# Patient Record
Sex: MALE | Race: WHITE | NOT HISPANIC OR LATINO | Employment: FULL TIME | URBAN - METROPOLITAN AREA
[De-identification: names, ages, dates, MRNs, and addresses within clinical notes are randomized per-mention and may not be internally consistent; named-entity substitution may affect disease eponyms.]

---

## 2017-10-03 NOTE — PROGRESS NOTES
SUBJECTIVE:                                                    Enmanuel Patterson is a 31 year old male who presents to clinic today for the following health issues:      HPI    Acute Illness   Acute illness concerns: Cough  Onset: 1 month ago    Fever: no     Chills/Sweats: YES    Headache (location?): no    Sinus Pressure:YES    Conjunctivitis:  YES: watery    Ear Pain: feels pressure in both ears    Rhinorrhea: YES    Congestion: YES    Sore Throat: no      Cough: YES-productive of yellow sputum    Wheeze: YES    Decreased Appetite: YES    Nausea: no    Vomiting: no    Diarrhea:  no    Dysuria/Freq.: no    Fatigue/Achiness: YES    Sick/Strep Exposure: YES     Therapies Tried and outcome: aferin, tylenol    HENT: Patient says his cold-like symptoms started a month ago. He thought he was in the clear a week ago and it got worse in the last week so he decided to come in. He notes that he has a feeling of congestion in his lungs and says that he has been coughing a lot especially this weekend.     Photophobia: The pt notes he is having sensitivity to light a lot recently.     Smoking: the patient is not interested in quitting at this time, says the only reason he would want to is for financial purposes.     Problem list and histories reviewed & adjusted, as indicated.  Additional history: as documented    Patient Active Problem List   Diagnosis     CARDIOVASCULAR SCREENING; LDL GOAL LESS THAN 160     Tobacco use disorder     Eczema     Past Surgical History:   Procedure Laterality Date     bursitis knee  7/26/2013     TONSILLECTOMY      age 4       Social History   Substance Use Topics     Smoking status: Current Every Day Smoker     Packs/day: 1.00     Years: 9.00     Types: Cigarettes     Smokeless tobacco: Never Used      Comment: 1 pack per day since 1997     Alcohol use 0.0 oz/week      Comment: weekends     Family History   Problem Relation Age of Onset     DIABETES Paternal Grandfather      Asthma Paternal  "Grandfather          ROS:  Constitutional, HEENT, cardiovascular, pulmonary, GI, , musculoskeletal, neuro, skin, endocrine and psych systems are negative, except as in HPI or otherwise noted.     This document serves as a record of the services and decisions personally performed and made by Delmi Hernández MD. It was created on her behalf by Ritchie Infante, a trained medical scribe. The creation of this document is based the provider's statements to the medical scribe.  Ritchie Infante, October 4, 2017 8:25 AM     OBJECTIVE:                                                    BP 90/54 (BP Location: Right arm, Patient Position: Chair, Cuff Size: Adult Regular)  Pulse 76  Temp 97.7  F (36.5  C) (Temporal)  Resp 16  Ht 1.797 m (5' 10.75\")  Wt 75.3 kg (166 lb)  SpO2 98%  BMI 23.32 kg/m2  Body mass index is 23.32 kg/(m^2).     GENERAL: healthy, alert, well nourished, well hydrated, no distress  HENT: ear canals- normal; TMs- normal; Nose- normal; Mouth- no ulcers, no lesions  NECK: no tenderness, no adenopathy, no asymmetry, no masses, no stiffness; thyroid- normal to palpation  RESP: Left-sided crackle, wheezing bilaterally  CV: regular rates and rhythm, normal S1 S2, no S3 or S4 and no murmur, no click or rub  SKIN: no suspicious lesions, no rashes to visible skin   PSYCH: Alert and oriented times 3; speech- coherent , normal rate and volume; able to articulate logical thoughts, able to abstract reason, no tangential thoughts, no hallucinations or delusions, affect- normal    Diagnostic test results:  No results found for this or any previous visit (from the past 24 hour(s)).       ASSESSMENT/PLAN:                                                      ICD-10-CM    1. Pneumonia of left lower lobe due to infectious organism (H) J18.1 amoxicillin-clavulanate (AUGMENTIN) 875-125 MG per tablet   2. Tobacco use disorder F17.200 TOBACCO CESSATION ORDER FOR    3. Need for prophylactic vaccination and inoculation against " influenza Z23    4. Need for prophylactic vaccination with tetanus-diphtheria (TD) Z23    5. Acute bronchitis, unspecified organism J20.9 albuterol (PROAIR HFA/PROVENTIL HFA/VENTOLIN HFA) 108 (90 BASE) MCG/ACT Inhaler     Chronic cough: discussed possible etiologies (pertussis/pneumonia). Antibiotics prescribed for the pneumonia as well as Albuterol for airway dilation to aid in expulsion of the mucous. Medication direction, dosage, and side effects discussed with patient.    Photophobia: the patient is advised to rest and drink fluids for his sensitivity to light, as it is likely from a migraine headache.     Smoking: patient is offered a smoking cessation plan. He defers at this time.     Vaccination(s) administered: none, defers    There are no Patient Instructions on file for this visit.    The information in this document, created by the medical scribe for me, accurately reflects the services I personally performed and the decisions made by me. I have reviewed and approved this document for accuracy.   MD Delmi Dover MD, MD  [facility]

## 2017-10-04 ENCOUNTER — OFFICE VISIT (OUTPATIENT)
Dept: FAMILY MEDICINE | Facility: OTHER | Age: 32
End: 2017-10-04
Payer: COMMERCIAL

## 2017-10-04 VITALS
HEART RATE: 76 BPM | SYSTOLIC BLOOD PRESSURE: 90 MMHG | RESPIRATION RATE: 16 BRPM | TEMPERATURE: 97.7 F | OXYGEN SATURATION: 98 % | BODY MASS INDEX: 23.24 KG/M2 | DIASTOLIC BLOOD PRESSURE: 54 MMHG | WEIGHT: 166 LBS | HEIGHT: 71 IN

## 2017-10-04 DIAGNOSIS — Z23 NEED FOR PROPHYLACTIC VACCINATION AND INOCULATION AGAINST INFLUENZA: ICD-10-CM

## 2017-10-04 DIAGNOSIS — J20.9 ACUTE BRONCHITIS, UNSPECIFIED ORGANISM: ICD-10-CM

## 2017-10-04 DIAGNOSIS — Z23 NEED FOR PROPHYLACTIC VACCINATION WITH TETANUS-DIPHTHERIA (TD): ICD-10-CM

## 2017-10-04 DIAGNOSIS — F17.200 TOBACCO USE DISORDER: ICD-10-CM

## 2017-10-04 DIAGNOSIS — J18.9 PNEUMONIA OF LEFT LOWER LOBE DUE TO INFECTIOUS ORGANISM: Primary | ICD-10-CM

## 2017-10-04 PROCEDURE — 99213 OFFICE O/P EST LOW 20 MIN: CPT | Performed by: FAMILY MEDICINE

## 2017-10-04 RX ORDER — ALBUTEROL SULFATE 90 UG/1
2 AEROSOL, METERED RESPIRATORY (INHALATION) EVERY 6 HOURS PRN
Qty: 1 INHALER | Refills: 1 | Status: SHIPPED | OUTPATIENT
Start: 2017-10-04 | End: 2018-08-11

## 2017-10-04 NOTE — NURSING NOTE
"Chief Complaint   Patient presents with     Cough     Panel Management     tdap, flu, smoking cessation       Initial BP 90/54 (BP Location: Right arm, Patient Position: Chair, Cuff Size: Adult Regular)  Pulse 76  Temp 97.7  F (36.5  C) (Temporal)  Resp 16  Ht 5' 10.75\" (1.797 m)  Wt 166 lb (75.3 kg)  SpO2 98%  BMI 23.32 kg/m2 Estimated body mass index is 23.32 kg/(m^2) as calculated from the following:    Height as of this encounter: 5' 10.75\" (1.797 m).    Weight as of this encounter: 166 lb (75.3 kg).  Medication Reconciliation: complete     Delmi Caballero, [facility]  October 4, 2017      "

## 2017-10-04 NOTE — MR AVS SNAPSHOT
"              After Visit Summary   10/4/2017    Enmanuel Patterson    MRN: [id number]           Patient Information     Date Of Birth          1985        Visit Information        Provider Department      10/4/2017 8:30 AM Delmi Hernández MD [facility]        Today's Diagnoses     Pneumonia of left lower lobe due to infectious organism (H)    -  1    Tobacco use disorder        Need for prophylactic vaccination and inoculation against influenza        Need for prophylactic vaccination with tetanus-diphtheria (TD)        Acute bronchitis, unspecified organism           Follow-ups after your visit        Who to contact     If you have questions or need follow up information about today's clinic visit or your schedule please contact [facility] directly at [phone].  Normal or non-critical lab and imaging results will be communicated to you by MyChart, letter or phone within 4 business days after the clinic has received the results. If you do not hear from us within 7 days, please contact the clinic through [facility] or phone. If you have a critical or abnormal lab result, we will notify you by phone as soon as possible.  Submit refill requests through [email or web address] or call your pharmacy and they will forward the refill request to us. Please allow 3 business days for your refill to be completed.          Additional Information About Your Visit        MyChart Information     [email or web address] lets you send messages to your doctor, view your test results, renew your prescriptions, schedule appointments and more. To sign up, go to [email or web address] . Click on \"Log in\" on the left side of the screen, which will take you to the Welcome page. Then click on \"Sign up Now\" on the right side of the page.     You will be asked to enter the access code listed below, as well as some personal information. Please follow the directions to create your username and password.     Your access code is: " "[email or web address]  Expires: 2018  8:33 AM     Your access code will  in 90 days. If you need help or a new code, please call your [facility] clinic or [phone].        Care EveryWhere ID     This is your Care EveryWhere ID. This could be used by other organizations to access your [facility] medical records  [id number]        Your Vitals Were     Pulse Temperature Respirations Height Pulse Oximetry BMI (Body Mass Index)    76 97.7  F (36.5  C) (Temporal) 16 5' 10.75\" (1.797 m) 98% 23.32 kg/m2       Blood Pressure from Last 3 Encounters:   10/04/17 90/54   10/07/15 100/70   06/01/15 120/84    Weight from Last 3 Encounters:   10/04/17 166 lb (75.3 kg)   10/07/15 166 lb 3.2 oz (75.4 kg)   06/01/15 160 lb 1.6 oz (72.6 kg)              We Performed the Following     TOBACCO CESSATION ORDER FOR HM          Today's Medication Changes          These changes are accurate as of: 10/4/17  8:33 AM.  If you have any questions, ask your nurse or doctor.               Start taking these medicines.        Dose/Directions    albuterol 108 (90 BASE) MCG/ACT Inhaler   Commonly known as:  PROAIR HFA/PROVENTIL HFA/VENTOLIN HFA   Used for:  Acute bronchitis, unspecified organism   Started by:  Delmi Hernández MD        Dose:  2 puff   Inhale 2 puffs into the lungs every 6 hours as needed for shortness of breath / dyspnea or wheezing   Quantity:  1 Inhaler   Refills:  1       amoxicillin-clavulanate 875-125 MG per tablet   Commonly known as:  AUGMENTIN   Used for:  Pneumonia of left lower lobe due to infectious organism (H)   Started by:  Delmi Hernández MD        Dose:  1 tablet   Take 1 tablet by mouth 2 times daily   Quantity:  20 tablet   Refills:  0            Where to get your medicines      These medications were sent to [facility] [place], [place] - [address]  [address], [facility] [address]     Phone:  [phone]     albuterol 108 (90 BASE) MCG/ACT Inhaler    amoxicillin-clavulanate 875-125 MG " per tablet                Primary Care Provider Office Phone # Fax #    Zaki Jacome [phone]       [address] [facility] [address]        Equal Access to Services     SAIMA WESTFALL : Hi shahid keith White, waaxda luqadaha, qaybta kaalmada morro, jesse cm laUtepham boyce. So [facility] [phone].    ATENCIÓN: Si habla español, tiene a wray disposición servicios gratuitos de asistencia lingüística. Llame al [phone].    We comply with applicable federal civil rights laws and [place] laws. We do not discriminate on the basis of race, color, national origin, age, disability, sex, sexual orientation, or gender identity.            Thank you!     Thank you for choosing [facility]  for your care. Our goal is always to provide you with excellent care. Hearing back from our patients is one way we can continue to improve our services. Please take a few minutes to complete the written survey that you may receive in the mail after your visit with us. Thank you!             Your Updated Medication List - Protect others around you: Learn how to safely use, store and throw away your medicines at www.disposemymeds.org.          This list is accurate as of: 10/4/17  8:33 AM.  Always use your most recent med list.                   Brand Name Dispense Instructions for use Diagnosis    albuterol 108 (90 BASE) MCG/ACT Inhaler    PROAIR HFA/PROVENTIL HFA/VENTOLIN HFA    1 Inhaler    Inhale 2 puffs into the lungs every 6 hours as needed for shortness of breath / dyspnea or wheezing    Acute bronchitis, unspecified organism       amoxicillin-clavulanate 875-125 MG per tablet    AUGMENTIN    20 tablet    Take 1 tablet by mouth 2 times daily    Pneumonia of left lower lobe due to infectious organism (H)       ZYRTEC ALLERGY PO      Take 1 tablet by mouth daily.

## 2018-03-05 ENCOUNTER — OFFICE VISIT (OUTPATIENT)
Dept: FAMILY MEDICINE | Facility: CLINIC | Age: 33
End: 2018-03-05
Payer: COMMERCIAL

## 2018-03-05 VITALS
HEART RATE: 62 BPM | SYSTOLIC BLOOD PRESSURE: 110 MMHG | OXYGEN SATURATION: 98 % | DIASTOLIC BLOOD PRESSURE: 72 MMHG | BODY MASS INDEX: 23.72 KG/M2 | RESPIRATION RATE: 16 BRPM | WEIGHT: 175.1 LBS | TEMPERATURE: 98.7 F | HEIGHT: 72 IN

## 2018-03-05 DIAGNOSIS — M70.51 INFRAPATELLAR BURSITIS OF RIGHT KNEE: Primary | ICD-10-CM

## 2018-03-05 PROCEDURE — 99214 OFFICE O/P EST MOD 30 MIN: CPT | Performed by: PHYSICIAN ASSISTANT

## 2018-03-05 RX ORDER — DOXYCYCLINE 100 MG/1
100 CAPSULE ORAL 2 TIMES DAILY
Qty: 20 CAPSULE | Refills: 0 | Status: SHIPPED | OUTPATIENT
Start: 2018-03-05 | End: 2018-04-27

## 2018-03-05 ASSESSMENT — PAIN SCALES - GENERAL: PAINLEVEL: MODERATE PAIN (4)

## 2018-03-05 NOTE — PROGRESS NOTES
"  SUBJECTIVE:   Enmanuel Patterson is a 32 year old male who presents to clinic today for the following health issues:      Musculoskeletal Problem     History of Present Illness     Diet:  Regular (no restrictions)  Frequency of exercise:  None  Taking medications regularly:  Yes  Additional concerns today:  No    Joint Pain- bilateral knee    Onset: couple months  Gets swelling below both knee caps- is on knees often crawling around. .   The swelling has been present awhile and was getting annoying.  Hx of infrapatellar bursitis.   A few weeks ago sterilized a large needle from a nail kit and poked a hole in RIGHT knee (infrapatellar bursa?); drained thick straw color clear material.   Then filled back up.  So today just squeezed it hard and it drained from the site he poked at it initially. Drained a lot more blood than before. Kind of sore.   Does not feel warm to the touch  No fevers/chills  No malaise  Here today because \"maybe I need an antibiotic or something\".       Description:   Location: left knee and right knee  Character: Dull ache    Intensity: mild    Progression of Symptoms: better    Accompanying Signs & Symptoms:  Other symptoms: warmth, swelling and draining    History:   Previous similar pain: YES      Precipitating factors:   Trauma or overuse: YES- work    Alleviating factors:  Improved by: rest/inactivity, Ibuprofen and NSAID     Therapies Tried and outcome: rest/inactivity, Ibuprofen and NSAID       Problem list and histories reviewed & adjusted, as indicated.  Additional history: as documented      Patient Active Problem List   Diagnosis     CARDIOVASCULAR SCREENING; LDL GOAL LESS THAN 160     Tobacco use disorder     Eczema     Past Surgical History:   Procedure Laterality Date     bursitis knee  7/26/2013     TONSILLECTOMY      age 4       Social History   Substance Use Topics     Smoking status: Current Every Day Smoker     Packs/day: 1.00     Years: 9.00     Types: " "Cigarettes     Smokeless tobacco: Never Used      Comment: 1 pack per day since 1997     Alcohol use 0.0 oz/week      Comment: weekends     Family History   Problem Relation Age of Onset     DIABETES Paternal Grandfather      Asthma Paternal Grandfather          Current Outpatient Prescriptions   Medication Sig Dispense Refill     doxycycline (VIBRAMYCIN) 100 MG capsule Take 1 capsule (100 mg) by mouth 2 times daily 20 capsule 0     Cetirizine HCl (ZYRTEC ALLERGY PO) Take 1 tablet by mouth daily.       albuterol (PROAIR HFA/PROVENTIL HFA/VENTOLIN HFA) 108 (90 BASE) MCG/ACT Inhaler Inhale 2 puffs into the lungs every 6 hours as needed for shortness of breath / dyspnea or wheezing (Patient not taking: Reported on 3/5/2018) 1 Inhaler 1     Allergies   Allergen Reactions     Sulfa Drugs      BP Readings from Last 3 Encounters:   03/05/18 110/72   10/04/17 90/54   10/07/15 100/70    Wt Readings from Last 3 Encounters:   03/05/18 175 lb 1.6 oz (79.4 kg)   10/04/17 166 lb (75.3 kg)   10/07/15 166 lb 3.2 oz (75.4 kg)                  Labs reviewed in [facility]    ROS:  As above    OBJECTIVE:     /72  Pulse 62  Temp 98.7  F (37.1  C) (Temporal)  Resp 16  Ht 5' 11.85\" (1.825 m)  Wt 175 lb 1.6 oz (79.4 kg)  SpO2 98%  BMI 23.85 kg/m2  Body mass index is 23.85 kg/(m^2).  General: well appearing, pleasant 32 year old NAD  Knee: RIGHT: puncture site with small scab and 1mm confluent red ring around scab. No surrounding erythema, warmth or drainage from site. No bruising. Symmetric. Swelling of infrapatellar bursa, nontender, no erythema or warmth. No joint effusion. NON-tender to palpation at joint line. Patella nontender to exam, no prepatellar bursitis. No popliteal fullness or tenderness. ROM full extension, flexion past 90 degrees. No laxity with ligament testing. Meniscal testing negative. No calf pain or tenderness. Posterior tibial pulses normal, no edema, sensation intact. Gait normal.       Diagnostic Test " Results:  none     ASSESSMENT/PLAN:       1. Infrapatellar bursitis of right knee  Advised to STOP draining bursitis on his own- discussed risks of infection  Start doxy as below  Discussed treating bursitis- nsaids, knee pads, modifying activity  Ref to ortho given  - doxycycline (VIBRAMYCIN) 100 MG capsule; Take 1 capsule (100 mg) by mouth 2 times daily  Dispense: 20 capsule; Refill: 0  - ORTHOPEDICS ADULT REFERRAL    Follow Up: For worsening symptoms (ie new fevers, worsening pain, etc), non-improvement as expected/discussed, questions regarding your medications or treatment plan. Discussed parameters for follow up and included in After Visit Summary given to patient.      Annie Fox PA-C  [facility]  Answers for HPI/ROS submitted by the patient on 3/5/2018   PHQ-2 Score: 0

## 2018-03-05 NOTE — MR AVS SNAPSHOT
After Visit Summary   3/5/2018    Enmanuel Patterson    MRN: [id number]           Patient Information     Date Of Birth          1985        Visit Information        Provider Department      3/5/2018 4:00 PM Annie Fox PA-C [facility]        Today's Diagnoses     Infrapatellar bursitis of right knee    -  1      Care Instructions    Do not try to drain anymore material from the bursa    Start the doxycycline 100mg twice daily for 10 days  Should be seen urgently if red, hot, draining pus/mucus    Knee pads, stay off as able  Aleve twice daily- take with food. Stop if gives you heartburn or upset stomach.  Hydrate well when you take it.     If bursitis does not get better see  - orthopedic MD here in [place] on Thursdays    What is bursitis?  A bursa is a fluid-filled sac that helps cushion the muscles, tendons, and bones around a joint. When a bursa becomes inflamed, it s called bursitis. Common symptoms of bursitis include pain, tenderness, and swelling that limits movement of the joint.  What causes bursitis?  Bursitis is most often caused by overuse of a joint. The repeated movements irritate the bursa and may cause it to swell. When that happens, other surrounding tissues may become inflamed or have less space to move. Bursitis is most common in large joints such as the knee, shoulder, and hip.       Nonsurgical treatment involves both rest and exercise.    How is bursitis treated?  To help reduce pain and swelling, your healthcare provider may recommend one or more of the following:     Rest gives the bursa time to heal. This means limiting activities that put stress on the joint.    Anti-inflammatory medications help reduce painful swelling. In some cases, this can include injections of cortisone or other steroid medicines into the bursa.    Splints and support bandages improve your comfort and allow the bursa to heal.    Physical therapy may be used to  increase flexibility and strengthen muscles that support the joint.    Aspiration removes extra fluid from the bursa using a needle. This can help your healthcare provider find out what is causing your bursitis. For example, it might be an infection or overuse.     Surgery can be used to remove an inflamed or infected bursa. This is rarely needed.  Date Last Reviewed: 9/11/2015 2000-2017 [employer]. [address] [place] [address]. All rights reserved. This information is not intended as a substitute for professional medical care. Always follow your healthcare professional's instructions.                Follow-ups after your visit        Additional Services     ORTHOPEDICS ADULT REFERRAL       Your provider has referred you to: FMG: [facility] [phone]   [email or web address]  FMG: [facility] [phone]   [email or web address]  FMG: [facility] [phone]    [email or web address]    Please be aware that coverage of these services is subject to the terms and limitations of your health insurance plan.  Call member services at your health plan with any benefit or coverage questions.      Please bring the following to your appointment:    >>   Any x-rays, CTs or MRIs which have been performed.  Contact the facility where they were done to arrange for  prior to your scheduled appointment.    >>   List of current medications   >>   This referral request   >>   Any documents/labs given to you for this referral                  Who to contact     If you have questions or need follow up information about today's clinic visit or your schedule please contact [facility] directly at [phone].  Normal or non-critical lab and imaging results will be communicated to you by MyChart, letter or phone within 4 business days after  "the clinic has received the results. If you do not hear from us within 7 days, please contact the clinic through PlaceSpeak or phone. If you have a critical or abnormal lab result, we will notify you by phone as soon as possible.  Submit refill requests through PlaceSpeak or call your pharmacy and they will forward the refill request to us. Please allow 3 business days for your refill to be completed.          Additional Information About Your Visit        PlaceSpeak Information     PlaceSpeak lets you send messages to your doctor, view your test results, renew your prescriptions, schedule appointments and more. To sign up, go to [email or web address] . Click on \"Log in\" on the left side of the screen, which will take you to the Welcome page. Then click on \"Sign up Now\" on the right side of the page.     You will be asked to enter the access code listed below, as well as some personal information. Please follow the directions to create your username and password.     Your access code is: [email or web address]  Expires: 6/3/2018  4:47 PM     Your access code will  in 90 days. If you need help or a new code, please call your [place] clinic or [phone].        Care EveryWhere ID     This is your Care EveryWhere ID. This could be used by other organizations to access your [place] medical records  [id number]        Your Vitals Were     Pulse Temperature Respirations Height Pulse Oximetry BMI (Body Mass Index)    62 98.7  F (37.1  C) (Temporal) 16 5' 11.85\" (1.825 m) 98% 23.85 kg/m2       Blood Pressure from Last 3 Encounters:   18 110/72   10/04/17 90/54   10/07/15 100/70    Weight from Last 3 Encounters:   18 175 lb 1.6 oz (79.4 kg)   10/04/17 166 lb (75.3 kg)   10/07/15 166 lb 3.2 oz (75.4 kg)              We Performed the Following     ORTHOPEDICS ADULT REFERRAL          Today's Medication Changes          These changes are accurate as of 3/5/18  4:47 PM.  If you have any questions, ask your nurse or doctor.    "            Start taking these medicines.        Dose/Directions    doxycycline 100 MG capsule   Commonly known as:  VIBRAMYCIN   Used for:  Infrapatellar bursitis of right knee   Started by:  Annie Fox PA-C        Dose:  100 mg   Take 1 capsule (100 mg) by mouth 2 times daily   Quantity:  20 capsule   Refills:  0            Where to get your medicines      These medications were sent to [facility] [address] [place] [address] [facility] [address]  [address] [facility], [facility] [address]     Phone:  [phone]     doxycycline 100 MG capsule                Primary Care Provider Office Phone # Fax #    Zaki Jacome [phone]       [address] [facility] [address]        Equal Access to Services     [facility]: Hadii aad ku hadasho Soomaali, waaxda luqadaha, qaybta kaalmada adeegyada, jesse mercado . So [facility] [phone].    ATENCIÓN: Si habla español, tiene a wray disposición servicios gratuitos de asistencia lingüística. LlJ.[facility] [phone].    We comply with applicable federal civil rights laws and [place] laws. We do not discriminate on the basis of race, color, national origin, age, disability, sex, sexual orientation, or gender identity.            Thank you!     Thank you for choosing [facility]  for your care. Our goal is always to provide you with excellent care. Hearing back from our patients is one way we can continue to improve our services. Please take a few minutes to complete the written survey that you may receive in the mail after your visit with us. Thank you!             Your Updated Medication List - Protect others around you: Learn how to safely use, store and throw away your medicines at www.disposemymeds.org.          This list is accurate as of 3/5/18  4:47 PM.  Always use your most recent med list.                   Brand Name Dispense Instructions for use Diagnosis    albuterol  108 (90 BASE) MCG/ACT Inhaler    PROAIR HFA/PROVENTIL HFA/VENTOLIN HFA    1 Inhaler    Inhale 2 puffs into the lungs every 6 hours as needed for shortness of breath / dyspnea or wheezing    Acute bronchitis, unspecified organism       doxycycline 100 MG capsule    VIBRAMYCIN    20 capsule    Take 1 capsule (100 mg) by mouth 2 times daily    Infrapatellar bursitis of right knee       ZYRTEC ALLERGY PO      Take 1 tablet by mouth daily.

## 2018-03-05 NOTE — PATIENT INSTRUCTIONS
Do not try to drain anymore material from the bursa    Start the doxycycline 100mg twice daily for 10 days  Should be seen urgently if red, hot, draining pus/mucus    Knee pads, stay off as able  Aleve twice daily- take with food. Stop if gives you heartburn or upset stomach.  Hydrate well when you take it.     If bursitis does not get better see  - orthopedic MD here in [place] on Thursdays    What is bursitis?  A bursa is a fluid-filled sac that helps cushion the muscles, tendons, and bones around a joint. When a bursa becomes inflamed, it s called bursitis. Common symptoms of bursitis include pain, tenderness, and swelling that limits movement of the joint.  What causes bursitis?  Bursitis is most often caused by overuse of a joint. The repeated movements irritate the bursa and may cause it to swell. When that happens, other surrounding tissues may become inflamed or have less space to move. Bursitis is most common in large joints such as the knee, shoulder, and hip.       Nonsurgical treatment involves both rest and exercise.    How is bursitis treated?  To help reduce pain and swelling, your healthcare provider may recommend one or more of the following:     Rest gives the bursa time to heal. This means limiting activities that put stress on the joint.    Anti-inflammatory medications help reduce painful swelling. In some cases, this can include injections of cortisone or other steroid medicines into the bursa.    Splints and support bandages improve your comfort and allow the bursa to heal.    Physical therapy may be used to increase flexibility and strengthen muscles that support the joint.    Aspiration removes extra fluid from the bursa using a needle. This can help your healthcare provider find out what is causing your bursitis. For example, it might be an infection or overuse.     Surgery can be used to remove an inflamed or infected bursa. This is rarely needed.  Date Last Reviewed:  9/11/2015 2000-2017 [employer]. [address] [place] [address], [place] [address]. All rights reserved. This information is not intended as a substitute for professional medical care. Always follow your healthcare professional's instructions.

## 2018-03-08 ENCOUNTER — OFFICE VISIT (OUTPATIENT)
Dept: ORTHOPEDICS | Facility: CLINIC | Age: 33
End: 2018-03-08
Payer: COMMERCIAL

## 2018-03-08 VITALS — TEMPERATURE: 98.2 F | BODY MASS INDEX: 24.5 KG/M2 | HEIGHT: 71 IN | RESPIRATION RATE: 18 BRPM | WEIGHT: 175 LBS

## 2018-03-08 DIAGNOSIS — M70.41 PREPATELLAR BURSITIS OF RIGHT KNEE: Primary | ICD-10-CM

## 2018-03-08 PROCEDURE — 99243 OFF/OP CNSLTJ NEW/EST LOW 30: CPT | Performed by: ORTHOPAEDIC SURGERY

## 2018-03-08 NOTE — PROGRESS NOTES
Visit Date:   2018      HISTORY OF PRESENT ILLNESS:  Mr. Chahal is a 32-year-old male referred from Annie Fox for evaluation of right knee swelling and pain.  He has had problems for the past year with swelling over the front of the tibia.  He works in , does a lot of kneeling and crawling.  He has recently started using knee pads.  He has recently drained of fluid from his own bursa with a sterilized needle.  He has no numbness or tingling, does have some residual swelling, has dull constant pain rated 1/10, worse with kneeling, better if he does not kneel on it.  He has not used a compressive sleeve.      PHYSICAL EXAMINATION:  Examination shows full range of motion of the knee without pain.  He has no patellofemoral crepitation, negative medial and lateral Marie's.  He had no ligamentous laxity of MCL, LCL or cruciates.  He does have some swelling over the pretibial area on the right.  No swelling over the patella itself.  He has a small scab just to the lateral aspect of the pretibial area where he has recently drained fluid.  There is no increased warmth or erythema.  Sensation and circulation are intact.      IMPRESSION:  Pretibial bursitis with mild residual fluid here.  He had no sign of compression of the infrapatellar branch of saphenous nerve as he is not tender to percussion here.  We have advised a Tubigrip for swelling control, avoidance of kneeling and crawling.  If he has to kneel, he should use knee pads.  I have explained that surgery is only necessary if he develops pain from the bursitis, specifically if he has prepatellar bands that are painful.  For now, we will just observe this as he has minimal fluid and has not caused infection.  We will see him back p.r.n.         YUNIOR YOO MD             D: 2018   T: 2018   MT: MONSE      Name:     ALICIA CHAHAL   MRN:      [id number]        Account:      [id number]   :      1985            Visit Date:   03/08/2018      Document: [id number]

## 2018-03-08 NOTE — LETTER
3/8/2018         RE: Enmanuel Patterson  [address] [facility] [address]        Dear Colleague,    Thank you for referring your patient, Enmanuel Patterson, to the [facility]. Please see a copy of my visit note below.    Enmanuel Patterson is a 32 year old male who is seen in consultation at the request of Annie Fox PA-C  for right knee bursitis.    Past Medical History:   Diagnosis Date     Testicular torsion 1995     Tobacco abuse since 1997       Past Surgical History:   Procedure Laterality Date     bursitis knee  7/26/2013     TONSILLECTOMY      age 4       Family History   Problem Relation Age of Onset     DIABETES Paternal Grandfather      Asthma Paternal Grandfather        Social History     Social History     Marital status: Single     Spouse name: N/A     Number of children: N/A     Years of education: N/A     Occupational History     Not on file.     Social History Main Topics     Smoking status: Current Every Day Smoker     Packs/day: 1.00     Years: 9.00     Types: Cigarettes     Smokeless tobacco: Never Used      Comment: 1 pack per day since 1997     Alcohol use 0.0 oz/week      Comment: weekends     Drug use: No     Sexual activity: Yes     Partners: Female      Comment: not currently     Other Topics Concern      Service No     Blood Transfusions No     Caffeine Concern No     Occupational Exposure No     Hobby Hazards No     Sleep Concern No     Stress Concern No     Weight Concern No     Special Diet No     Exercise No     Bike Helmet Yes     Seat Belt No     Parent/Sibling W/ Cabg, Mi Or Angioplasty Before 65f 55m? No     Social History Narrative       Current Outpatient Prescriptions   Medication Sig Dispense Refill     doxycycline (VIBRAMYCIN) 100 MG capsule Take 1 capsule (100 mg) by mouth 2 times daily 20 capsule 0     albuterol (PROAIR HFA/PROVENTIL HFA/VENTOLIN HFA) 108 (90 BASE) MCG/ACT Inhaler Inhale 2 puffs into the lungs every 6 hours as needed for  "shortness of breath / dyspnea or wheezing 1 Inhaler 1     Cetirizine HCl (ZYRTEC ALLERGY PO) Take 1 tablet by mouth daily.         Allergies   Allergen Reactions     Sulfa Drugs        REVIEW OF SYSTEMS:  CONSTITUTIONAL:  NEGATIVE for fever, chills, change in weight, not feeling tired  SKIN:  NEGATIVE for worrisome rashes, no skin lumps, no skin ulcers and no non-healing wounds  EYES:  NEGATIVE for vision changes or irritation.  ENT/MOUTH:  NEGATIVE.  No hearing loss, no hoarseness, no difficulty swallowing.  RESP:  NEGATIVE. No cough or shortness of breath.  CV:  NEGATIVE for chest pain, palpitations or peripheral edema  GI:  NEGATIVE for nausea, abdominal pain, heartburn, or change in bowel habits  :  Negative. No dysuria, no hematuria  MUSCULOSKELETAL:  See HPI above  NEURO:  NEGATIVE . No headaches, no dizziness,  no numbness  ENDOCRINE:  NEGATIVE for temperature intolerance, skin/hair changes  HEME/ALLERGY/IMMUNE:  NEGATIVE for bleeding problems  PSYCHIATRIC:  NEGATIVE. no anxiety, no depression.      Exam:  Vitals: Temp 98.2  F (36.8  C)  Resp 18  Ht 1.797 m (5' 10.75\")  Wt 79.4 kg (175 lb)  BMI 24.58 kg/m2  BMI= Body mass index is 24.58 kg/(m^2).  Constitutional:  healthy, alert and no distress  Neuro: Alert and Oriented x 3, Gait normal. Sensation grossly WNL.  HEENT:  Atraumatic, EOMI  Neck:  Neck supple with no tenderness.  Psych: Affect normal   Respiratory: Breathing not labored.  Cardiovascular: normal peripheral pulses  Lymph: no adenopathy  Skin: No rashes,worrisome lesions or skin problems  Spine: straight, no straight leg raising pain.  Hips show full range of motion.  There is no tenderness over the sacro-iliac joints, sciatic notch, or greater trochanters.       Again, thank you for allowing me to participate in the care of your patient.        Sincerely,        Abel Caballero MD    "

## 2018-03-08 NOTE — MR AVS SNAPSHOT
"              After Visit Summary   3/8/2018    Enmanuel Patterson    MRN: [id number]           Patient Information     Date Of Birth          1985        Visit Information        Provider Department      3/8/2018 10:45 AM Abel Caballero MD [facility]        Today's Diagnoses     Prepatellar bursitis of right knee    -  1       Follow-ups after your visit        Who to contact     If you have questions or need follow up information about today's clinic visit or your schedule please contact [facility] at [phone].  Normal or non-critical lab and imaging results will be communicated to you by Merushart, letter or phone within 4 business days after the clinic has received the results. If you do not hear from us within 7 days, please contact the clinic through Merushart or phone. If you have a critical or abnormal lab result, we will notify you by phone as soon as possible.  Submit refill requests through SigFig or call your pharmacy and they will forward the refill request to us. Please allow 3 business days for your refill to be completed.          Additional Information About Your Visit        MyChart Information     SigFig lets you send messages to your doctor, view your test results, renew your prescriptions, schedule appointments and more. To sign up, go to [email or web address] . Click on \"Log in\" on the left side of the screen, which will take you to the Welcome page. Then click on \"Sign up Now\" on the right side of the page.     You will be asked to enter the access code listed below, as well as some personal information. Please follow the directions to create your username and password.     Your access code is: [email or web address]  Expires: 6/3/2018  4:47 PM     Your access code will  in 90 days. If you need help or a new code, please call your [facility] or [phone].        Care EveryWhere ID     This is your Care EveryWhere ID. This could be used by " "other organizations to access your [place] medical records  [id number]        Your Vitals Were     Temperature Respirations Height BMI (Body Mass Index)          98.2  F (36.8  C) 18 1.797 m (5' 10.75\") 24.58 kg/m2         Blood Pressure from Last 3 Encounters:   03/05/18 110/72   10/04/17 90/54   10/07/15 100/70    Weight from Last 3 Encounters:   03/08/18 79.4 kg (175 lb)   03/05/18 79.4 kg (175 lb 1.6 oz)   10/04/17 75.3 kg (166 lb)              Today, you had the following     No orders found for display       Primary Care Provider Office Phone # Fax #    Zaki Jacome [phone] [address] [facility] [address]        Equal Access to Services     [facility]: Hadii soy shahid hadasho Soomaali, waaxda luqadaha, qaybta kaalmada adeindrayada, jesse mercado . So [facility] [phone].    ATENCIÓN: Si habla español, tiene a wray disposición servicios gratuitos de asistencia lingüística. Llame al [phone].    We comply with applicable federal civil rights laws and [place] laws. We do not discriminate on the basis of race, color, national origin, age, disability, sex, sexual orientation, or gender identity.            Thank you!     Thank you for choosing [facility]  for your care. Our goal is always to provide you with excellent care. Hearing back from our patients is one way we can continue to improve our services. Please take a few minutes to complete the written survey that you may receive in the mail after your visit with us. Thank you!             Your Updated Medication List - Protect others around you: Learn how to safely use, store and throw away your medicines at www.disposemymeds.org.          This list is accurate as of 3/8/18 11:51 AM.  Always use your most recent med list.                   Brand Name Dispense Instructions for use Diagnosis    albuterol 108 (90 BASE) MCG/ACT Inhaler    PROAIR HFA/PROVENTIL HFA/VENTOLIN HFA    1 Inhaler "    Inhale 2 puffs into the lungs every 6 hours as needed for shortness of breath / dyspnea or wheezing    Acute bronchitis, unspecified organism       doxycycline 100 MG capsule    VIBRAMYCIN    20 capsule    Take 1 capsule (100 mg) by mouth 2 times daily    Infrapatellar bursitis of right knee       ZYRTEC ALLERGY PO      Take 1 tablet by mouth daily.

## 2018-03-08 NOTE — PROGRESS NOTES
Enmanuel Patterson is a 32 year old male who is seen in consultation at the request of Annie Fox PA-C  for right knee bursitis.    Past Medical History:   Diagnosis Date     Testicular torsion 1995     Tobacco abuse since 1997       Past Surgical History:   Procedure Laterality Date     bursitis knee  7/26/2013     TONSILLECTOMY      age 4       Family History   Problem Relation Age of Onset     DIABETES Paternal Grandfather      Asthma Paternal Grandfather        Social History     Social History     Marital status: Single     Spouse name: N/A     Number of children: N/A     Years of education: N/A     Occupational History     Not on file.     Social History Main Topics     Smoking status: Current Every Day Smoker     Packs/day: 1.00     Years: 9.00     Types: Cigarettes     Smokeless tobacco: Never Used      Comment: 1 pack per day since 1997     Alcohol use 0.0 oz/week      Comment: weekends     Drug use: No     Sexual activity: Yes     Partners: Female      Comment: not currently     Other Topics Concern      Service No     Blood Transfusions No     Caffeine Concern No     Occupational Exposure No     Hobby Hazards No     Sleep Concern No     Stress Concern No     Weight Concern No     Special Diet No     Exercise No     Bike Helmet Yes     Seat Belt No     Parent/Sibling W/ Cabg, Mi Or Angioplasty Before 65f 55m? No     Social History Narrative       Current Outpatient Prescriptions   Medication Sig Dispense Refill     doxycycline (VIBRAMYCIN) 100 MG capsule Take 1 capsule (100 mg) by mouth 2 times daily 20 capsule 0     albuterol (PROAIR HFA/PROVENTIL HFA/VENTOLIN HFA) 108 (90 BASE) MCG/ACT Inhaler Inhale 2 puffs into the lungs every 6 hours as needed for shortness of breath / dyspnea or wheezing 1 Inhaler 1     Cetirizine HCl (ZYRTEC ALLERGY PO) Take 1 tablet by mouth daily.         Allergies   Allergen Reactions     Sulfa Drugs        REVIEW OF SYSTEMS:  CONSTITUTIONAL:  NEGATIVE for fever,  "chills, change in weight, not feeling tired  SKIN:  NEGATIVE for worrisome rashes, no skin lumps, no skin ulcers and no non-healing wounds  EYES:  NEGATIVE for vision changes or irritation.  ENT/MOUTH:  NEGATIVE.  No hearing loss, no hoarseness, no difficulty swallowing.  RESP:  NEGATIVE. No cough or shortness of breath.  CV:  NEGATIVE for chest pain, palpitations or peripheral edema  GI:  NEGATIVE for nausea, abdominal pain, heartburn, or change in bowel habits  :  Negative. No dysuria, no hematuria  MUSCULOSKELETAL:  See HPI above  NEURO:  NEGATIVE . No headaches, no dizziness,  no numbness  ENDOCRINE:  NEGATIVE for temperature intolerance, skin/hair changes  HEME/ALLERGY/IMMUNE:  NEGATIVE for bleeding problems  PSYCHIATRIC:  NEGATIVE. no anxiety, no depression.      Exam:  Vitals: Temp 98.2  F (36.8  C)  Resp 18  Ht 1.797 m (5' 10.75\")  Wt 79.4 kg (175 lb)  BMI 24.58 kg/m2  BMI= Body mass index is 24.58 kg/(m^2).  Constitutional:  healthy, alert and no distress  Neuro: Alert and Oriented x 3, Gait normal. Sensation grossly WNL.  HEENT:  Atraumatic, EOMI  Neck:  Neck supple with no tenderness.  Psych: Affect normal   Respiratory: Breathing not labored.  Cardiovascular: normal peripheral pulses  Lymph: no adenopathy  Skin: No rashes,worrisome lesions or skin problems  Spine: straight, no straight leg raising pain.  Hips show full range of motion.  There is no tenderness over the sacro-iliac joints, sciatic notch, or greater trochanters.     "

## 2018-04-27 ENCOUNTER — OFFICE VISIT (OUTPATIENT)
Dept: FAMILY MEDICINE | Facility: CLINIC | Age: 33
End: 2018-04-27
Payer: COMMERCIAL

## 2018-04-27 VITALS
WEIGHT: 168 LBS | BODY MASS INDEX: 23.6 KG/M2 | HEART RATE: 82 BPM | TEMPERATURE: 98 F | SYSTOLIC BLOOD PRESSURE: 106 MMHG | DIASTOLIC BLOOD PRESSURE: 74 MMHG | OXYGEN SATURATION: 98 %

## 2018-04-27 DIAGNOSIS — Z11.3 SCREEN FOR STD (SEXUALLY TRANSMITTED DISEASE): ICD-10-CM

## 2018-04-27 DIAGNOSIS — B07.9 VIRAL WARTS, UNSPECIFIED TYPE: ICD-10-CM

## 2018-04-27 DIAGNOSIS — Z13.1 SCREENING FOR DIABETES MELLITUS: ICD-10-CM

## 2018-04-27 DIAGNOSIS — M25.50 PAIN IN JOINT, MULTIPLE SITES: ICD-10-CM

## 2018-04-27 DIAGNOSIS — M79.10 MYALGIA: ICD-10-CM

## 2018-04-27 DIAGNOSIS — Z13.220 SCREENING FOR HYPERLIPIDEMIA: ICD-10-CM

## 2018-04-27 DIAGNOSIS — N52.9 ERECTILE DYSFUNCTION, UNSPECIFIED ERECTILE DYSFUNCTION TYPE: ICD-10-CM

## 2018-04-27 DIAGNOSIS — F17.200 TOBACCO USE DISORDER: ICD-10-CM

## 2018-04-27 DIAGNOSIS — Z71.6 ENCOUNTER FOR SMOKING CESSATION COUNSELING: ICD-10-CM

## 2018-04-27 DIAGNOSIS — R53.83 FATIGUE, UNSPECIFIED TYPE: Primary | ICD-10-CM

## 2018-04-27 LAB
ERYTHROCYTE [DISTWIDTH] IN BLOOD BY AUTOMATED COUNT: 13.4 % (ref 10–15)
ERYTHROCYTE [SEDIMENTATION RATE] IN BLOOD BY WESTERGREN METHOD: 5 MM/H (ref 0–15)
HBA1C MFR BLD: 5.2 % (ref 0–5.6)
HCT VFR BLD AUTO: 43.1 % (ref 40–53)
HGB BLD-MCNC: 14.9 G/DL (ref 13.3–17.7)
MCH RBC QN AUTO: 31.4 PG (ref 26.5–33)
MCHC RBC AUTO-ENTMCNC: 34.6 G/DL (ref 31.5–36.5)
MCV RBC AUTO: 91 FL (ref 78–100)
PLATELET # BLD AUTO: 218 10E9/L (ref 150–450)
RBC # BLD AUTO: 4.75 10E12/L (ref 4.4–5.9)
WBC # BLD AUTO: 7 10E9/L (ref 4–11)

## 2018-04-27 PROCEDURE — 99214 OFFICE O/P EST MOD 30 MIN: CPT | Performed by: PHYSICIAN ASSISTANT

## 2018-04-27 PROCEDURE — 80061 LIPID PANEL: CPT | Performed by: PHYSICIAN ASSISTANT

## 2018-04-27 PROCEDURE — 83036 HEMOGLOBIN GLYCOSYLATED A1C: CPT | Performed by: PHYSICIAN ASSISTANT

## 2018-04-27 PROCEDURE — 87591 N.GONORRHOEAE DNA AMP PROB: CPT | Performed by: PHYSICIAN ASSISTANT

## 2018-04-27 PROCEDURE — 87491 CHLMYD TRACH DNA AMP PROBE: CPT | Performed by: PHYSICIAN ASSISTANT

## 2018-04-27 PROCEDURE — 85027 COMPLETE CBC AUTOMATED: CPT | Performed by: PHYSICIAN ASSISTANT

## 2018-04-27 PROCEDURE — 85652 RBC SED RATE AUTOMATED: CPT | Performed by: PHYSICIAN ASSISTANT

## 2018-04-27 PROCEDURE — 82306 VITAMIN D 25 HYDROXY: CPT | Performed by: PHYSICIAN ASSISTANT

## 2018-04-27 PROCEDURE — 86431 RHEUMATOID FACTOR QUANT: CPT | Performed by: PHYSICIAN ASSISTANT

## 2018-04-27 PROCEDURE — 87389 HIV-1 AG W/HIV-1&-2 AB AG IA: CPT | Performed by: PHYSICIAN ASSISTANT

## 2018-04-27 PROCEDURE — 84403 ASSAY OF TOTAL TESTOSTERONE: CPT | Performed by: PHYSICIAN ASSISTANT

## 2018-04-27 PROCEDURE — 86618 LYME DISEASE ANTIBODY: CPT | Performed by: PHYSICIAN ASSISTANT

## 2018-04-27 PROCEDURE — 86140 C-REACTIVE PROTEIN: CPT | Performed by: PHYSICIAN ASSISTANT

## 2018-04-27 PROCEDURE — 36415 COLL VENOUS BLD VENIPUNCTURE: CPT | Performed by: PHYSICIAN ASSISTANT

## 2018-04-27 PROCEDURE — 82550 ASSAY OF CK (CPK): CPT | Performed by: PHYSICIAN ASSISTANT

## 2018-04-27 PROCEDURE — 84443 ASSAY THYROID STIM HORMONE: CPT | Performed by: PHYSICIAN ASSISTANT

## 2018-04-27 PROCEDURE — 86780 TREPONEMA PALLIDUM: CPT | Performed by: PHYSICIAN ASSISTANT

## 2018-04-27 RX ORDER — IMIQUIMOD 12.5 MG/.25G
CREAM TOPICAL
Qty: 8 PACKET | Refills: 0 | Status: SHIPPED | OUTPATIENT
Start: 2018-04-27 | End: 2018-08-11

## 2018-04-27 ASSESSMENT — PAIN SCALES - GENERAL: PAINLEVEL: NO PAIN (0)

## 2018-04-27 NOTE — PROGRESS NOTES
"  SUBJECTIVE:   Enmanuel Patterson is a 32 year old male who presents to clinic today for the following health issues:      HPI  Joint Pain and fatigue     Onset: 2 months or so, no injury involved, low energy, poor/lack of sleep.      Patient wanted to check on his testosterone level.    Low sex drive. No desire to have intercourse or masturbate. Ongoing for over 1 yr. Worsening. Is now an issue in his relationship. Relationship is good otherwise - low desire \"its not her\".   Get erections, harder to achieve. Wakes with am erections \"not often\"- did this morning. Last intercourse 1 mo ago- erection is firm enough for intercourse most of the time but at times is softer. This has caused arguments with significant other.   Low motivation to do stuff. Could go home and just sit on couch. 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   Feels more emotional lately. \"Work sucks\". Has kids ages 6, and significant other has 10 yr old..   No financial stress. No extended family stress..   +smokin pack per day  Never had lipids checked  No fam hx of MI, diabetes in grandparent  Fatigued.   Doesn't sleep well. Up and out of bed. Going on for years. Tried sleep meds but made too groggy.   No herbals, supplements    Joints are stiff, muscles are sore. Legs and arms, feet. Newer in last 1-2 months. Worse at end of day.   Feels both muscular and joint.   No red, hot joints.   Mom \"issues with muscles\"  Outdoors - no specific tick exposure- but does a lot of outdoor activity  Work boots are supportive.   Eczema rashes in the past. No new rashes.     Std screening- would like testing. Monogamous. Hx of genital wart- treated w/aldara. Concerned has a a wart on the scrotum. Looks the same as a wart had treated a few yrs ago.  No new sexual partners.     Not ready to quit smoking. Has tried many things to quit. Has quit in the past but restarts. Meds, breathe right laser, etc.   \"I have to want to quit\".       Description: " "  Location: overall feel sore,  Character: body ache     Intensity: 0    Progression of Symptoms: same    Accompanying Signs & Symptoms:  Other symptoms: none    History:   Previous similar pain: no       Precipitating factors:   Trauma or overuse: no     Alleviating factors:  Improved by: nothing    Therapies Tried and outcome: nyquil for sleep- it seem to help, but feel \"like zombie\" the next morning      Problem list and histories reviewed & adjusted, as indicated.  Additional history: as documented      Patient Active Problem List   Diagnosis     CARDIOVASCULAR SCREENING; LDL GOAL LESS THAN 160     Tobacco use disorder     Eczema     Prepatellar bursitis of right knee     Past Surgical History:   Procedure Laterality Date     bursitis knee  7/26/2013     TONSILLECTOMY      age 4       Social History   Substance Use Topics     Smoking status: Current Every Day Smoker     Packs/day: 1.00     Years: 9.00     Types: Cigarettes     Smokeless tobacco: Current User     Types: Chew      Comment: 1 pack per day since 1997     Alcohol use 0.0 oz/week      Comment: weekends     Family History   Problem Relation Age of Onset     DIABETES Paternal Grandfather      Asthma Paternal Grandfather      No Known Problems Mother      No Known Problems Father          Current Outpatient Prescriptions   Medication Sig Dispense Refill     albuterol (PROAIR HFA/PROVENTIL HFA/VENTOLIN HFA) 108 (90 BASE) MCG/ACT Inhaler Inhale 2 puffs into the lungs every 6 hours as needed for shortness of breath / dyspnea or wheezing (Patient not taking: Reported on 4/27/2018) 1 Inhaler 1     Cetirizine HCl (ZYRTEC ALLERGY PO) Take 1 tablet by mouth daily.       Allergies   Allergen Reactions     Sulfa Drugs      BP Readings from Last 3 Encounters:   04/27/18 106/74   03/05/18 110/72   10/04/17 90/54    Wt Readings from Last 3 Encounters:   04/27/18 168 lb (76.2 kg)   03/08/18 175 lb (79.4 kg)   03/05/18 175 lb 1.6 oz (79.4 kg)                  Labs " reviewed in EPIC    ROS:  Constitutional, HEENT, cardiovascular, pulmonary, gi and gu systems are negative, except as otherwise noted.    OBJECTIVE:     /74  Pulse 82  Temp 98  F (36.7  C) (Oral)  Wt 168 lb (76.2 kg)  SpO2 98%  BMI 23.6 kg/m2  Body mass index is 23.6 kg/(m^2).  GENERAL: healthy, alert and no distress  EYES: Eyes grossly normal to inspection, PERRL and conjunctivae and sclerae normal  HENT: ear canals and TM's normal, nose and mouth without ulcers or lesions  NECK: no adenopathy, no asymmetry, masses, or scars and thyroid normal to palpation  RESP: lungs clear to auscultation - no rales, rhonchi or wheezes  CV: regular rate and rhythm, normal S1 S2, no S3 or S4, no murmur, click or rub, no peripheral edema and peripheral pulses strong  ABDOMEN: soft, nontender, no hepatosplenomegaly, no masses and bowel sounds normal  MS: Hands: tender w/palpation/compression of MCPs but no erythema or synovitis, normal ROM. No pain at CMC joints. Wrists nontender, normal ROM. Elbows nontender, normal ROM. Ankles nontender, normal ROM. Feet: tender 1st MTPs, less tender MTP 2-5 but mild tenderness. No midfoot, arch or heel tenderness, no erythema or synovitis.   : left lateral scrotum 3-4mm verrucous appearing lesion. No inguinal hernia, no testicular masses or tenderness, no penile discharge.   SKIN: no suspicious lesions or rashes  NEURO: Normal strength and tone, mentation intact and speech normal  PSYCH: mentation appears normal, affect normal/bright  LYMPH: normal ant/post cervical, supraclavicular nodes    Diagnostic Test Results:  Results for orders placed or performed in visit on 04/27/18   TSH with free T4 reflex   Result Value Ref Range    TSH 1.13 0.40 - 4.00 mU/L   Lipid panel reflex to direct LDL Fasting   Result Value Ref Range    Cholesterol 125 <200 mg/dL    Triglycerides 149 <150 mg/dL    HDL Cholesterol 35 (L) >39 mg/dL    LDL Cholesterol Calculated 60 <100 mg/dL    Non HDL Cholesterol  90 <130 mg/dL   Hemoglobin A1c   Result Value Ref Range    Hemoglobin A1C 5.2 0 - 5.6 %   ESR: Erythrocyte sedimentation rate   Result Value Ref Range    Sed Rate 5 0 - 15 mm/h   CRP, inflammation   Result Value Ref Range    CRP Inflammation <2.9 0.0 - 8.0 mg/L   CK total   Result Value Ref Range    CK Total 162 30 - 300 U/L   CBC with platelets   Result Value Ref Range    WBC 7.0 4.0 - 11.0 10e9/L    RBC Count 4.75 4.4 - 5.9 10e12/L    Hemoglobin 14.9 13.3 - 17.7 g/dL    Hematocrit 43.1 40.0 - 53.0 %    MCV 91 78 - 100 fl    MCH 31.4 26.5 - 33.0 pg    MCHC 34.6 31.5 - 36.5 g/dL    RDW 13.4 10.0 - 15.0 %    Platelet Count 218 150 - 450 10e9/L       ASSESSMENT/PLAN:       1. Fatigue, unspecified type  Likely multifactorial.   Normal labs as above, others pending at time of note closure.   Discussed mental health factors.    Pts long standing poor sleep pattern likely main contributor   - TSH with free T4 reflex  - CBC with platelets    2. Erectile dysfunction, unspecified erectile dysfunction type  Pt greatest concern is his testosterone level.  Discussed factors- vascular, hormonal, psychological.   Encouraged smoking cessation, potentially vascular factor. Will also screen lipids and blood sugar.   - Testosterone, total    3. Myalgia  Job is physical- sx occupational related due to nature of work and repetitive stress vs other.   He would like to do labs today as below   - ESR: Erythrocyte sedimentation rate  - CRP, inflammation  - Vitamin D Deficiency  - CK total    4. Pain in joint, multiple sites  Job is physical- sx occupational related due to nature of work and repetitive stress vs other.   Pt would like to do labs today as below  - ESR: Erythrocyte sedimentation rate  - CRP, inflammation  - Rheumatoid factor  - Lyme Disease Oly with reflex to WB Serum    5. Screening for diabetes mellitus  Non-fasting  - Hemoglobin A1c    6. Screening for hyperlipidemia  nonfasting  - Lipid panel reflex to direct LDL  Fasting    7. Screen for STD (sexually transmitted disease)  Pt requested screening; monogamous a number of years.   Safe sex practices discussed and encouraged. STD screening as below.   - Chlamydia trachomatis PCR  - Neisseria gonorrhoeae PCR  - HIV Antigen Antibody Combo  - Anti Treponema    8. Viral warts, unspecified type  Warty appearing lesion on scrotum- topical as below; discussed how to apply and possible side effects  - imiquimod (ALDARA) 5 % cream; Apply topically 3x weekly at bedtime and wash off after 8 hours. May use up to 16 weeks  Dispense: 8 packet; Refill: 0    9. Tobacco use disorder  10. Encounter for smoking cessation counseling  Pt not ready to quit      Follow Up: For worsening or changing symptoms, non-improvement as expected/discussed, questions regarding your medications or treatment plan patient was instructed to contact the clinic. Discussed parameters for follow up and included in After Visit Summary given to patient.      Annie Fox PA-C  [facility], [place]

## 2018-04-27 NOTE — MR AVS SNAPSHOT
After Visit Summary   4/27/2018    Enmanuel Patterson    MRN: [id number]           Patient Information     Date Of Birth          1985        Visit Information        Provider Department      4/27/2018 3:00 PM Annie Fox PA-C [facility]        Today's Diagnoses     Fatigue, unspecified type    -  1    Erectile dysfunction, unspecified erectile dysfunction type        Myalgia        Pain in joint, multiple sites        Screening for diabetes mellitus        Screening for hyperlipidemia        Screen for STD (sexually transmitted disease)        Tobacco use disorder        Viral warts, unspecified type          Care Instructions    Labs today    Results early next week    Apply small amount- thin layer to area (can use q-tip) every other day (2-3 x per week) for up to 16 weeks. Apply at bedtime, wash off in morning.   Can cause redness or burning at site    Quit smoking                Follow-ups after your visit        Who to contact     If you have questions or need follow up information about today's clinic visit or your schedule please contact [facility] directly at [phone].  Normal or non-critical lab and imaging results will be communicated to you by [facility], letter or phone within 4 business days after the clinic has received the results. If you do not hear from us within 7 days, please contact the clinic through [email or web address] or phone. If you have a critical or abnormal lab result, we will notify you by phone as soon as possible.  Submit refill requests through Sp[employer] or call your pharmacy and they will forward the refill request to us. Please allow 3 business days for your refill to be completed.          Additional Information About Your Visit        [facility] Information     Sp[employer] to Share lets you send messages to your doctor, view your test results, renew your prescriptions, schedule appointments and more. To sign up, go to [email or web address] to Share . Click on  "\"Log in\" on the left side of the screen, which will take you to the Welcome page. Then click on \"Sign up Now\" on the right side of the page.     You will be asked to enter the access code listed below, as well as some personal information. Please follow the directions to create your username and password.     Your access code is: [email or web address]  Expires: 6/3/2018  5:47 PM     Your access code will  in 90 days. If you need help or a new code, please call your [facility] clinic or [phone].        Care EveryWhere ID     This is your Care EveryWhere ID. This could be used by other organizations to access your [facility] medical records  [id number]        Your Vitals Were     Pulse Temperature Pulse Oximetry BMI (Body Mass Index)          82 98  F (36.7  C) (Oral) 98% 23.6 kg/m2         Blood Pressure from Last 3 Encounters:   18 106/74   18 110/72   10/04/17 90/54    Weight from Last 3 Encounters:   18 168 lb (76.2 kg)   18 175 lb (79.4 kg)   18 175 lb 1.6 oz (79.4 kg)              We Performed the Following     Anti Treponema     CBC with platelets     Chlamydia trachomatis PCR     CK total     CRP, inflammation     ESR: Erythrocyte sedimentation rate     Hemoglobin A1c     HIV Antigen Antibody Combo     Lipid panel reflex to direct LDL Fasting     Lyme Disease Oly with reflex to WB Serum     Neisseria gonorrhoeae PCR     Rheumatoid factor     Testosterone, total     TSH with free T4 reflex     Vitamin D Deficiency          Today's Medication Changes          These changes are accurate as of 18  4:09 PM.  If you have any questions, ask your nurse or doctor.               Start taking these medicines.        Dose/Directions    imiquimod 5 % cream   Commonly known as:  ALDARA   Used for:  Viral warts, unspecified type   Started by:  Annie Fox PA-C        Apply topically 3x weekly at bedtime and wash off after 8 hours. May use up to 16 weeks   Quantity:  8 packet "   Refills:  0            Where to get your medicines      These medications were sent to [facility] - [facility] [address] [facility] AT [facility] [address]  [address] [facility], [facility] [address]     Phone:  [phone]     imiquimod 5 % cream                Primary Care Provider Office Phone # Fax #    Zaki Jacome [phone]       [address] [facility] [address]        Equal Access to Services     SAIMA WESTFALL : Hadii aad ku hadasho Soomaali, waaxda luqadaha, qaybta kaalmada adeegyada, waxay idiin hayaan adeeg kharash lachar . So [facility] [phone].    ATENCIÓN: Si habla español, tiene a wray disposición servicios gratuitos de asistencia lingüística. [facility] [phone].    We comply with applicable federal civil rights laws and [place] laws. We do not discriminate on the basis of race, color, national origin, age, disability, sex, sexual orientation, or gender identity.            Thank you!     Thank you for choosing [facility]  for your care. Our goal is always to provide you with excellent care. Hearing back from our patients is one way we can continue to improve our services. Please take a few minutes to complete the written survey that you may receive in the mail after your visit with us. Thank you!             Your Updated Medication List - Protect others around you: Learn how to safely use, store and throw away your medicines at www.disposemymeds.org.          This list is accurate as of 4/27/18  4:09 PM.  Always use your most recent med list.                   Brand Name Dispense Instructions for use Diagnosis    albuterol 108 (90 Base) MCG/ACT Inhaler    PROAIR HFA/PROVENTIL HFA/VENTOLIN HFA    1 Inhaler    Inhale 2 puffs into the lungs every 6 hours as needed for shortness of breath / dyspnea or wheezing    Acute bronchitis, unspecified organism       imiquimod 5 % cream    ALDARA    8 packet    Apply topically 3x weekly at bedtime  and wash off after 8 hours. May use up to 16 weeks    Viral warts, unspecified type       ZYRTEC ALLERGY PO      Take 1 tablet by mouth daily.

## 2018-04-27 NOTE — LETTER
"May 2, 2018      Enmanuel Patterson  [address] [place] [address]        Dear ,    We are writing to inform you of your test results.    Your vitamin D level was low. This can cause muscle aching and soreness. Plan to take the prescription Vit D once weekly for 8 weeks, then start a daily vitamin D over the counter supplement 1000 IU daily.     The testosterone level is normal.  Your STD testing was negative for chlamydia, gonorrhea, HIV and syphilis.   Thyroid was normal  Screening for diabetes was normal  Joint pain labs: Sed rate and CRP are generic testing for inflammation (can be injury, overuse, or arthritis) were normal   Rheumatoid factor tests for autoimmune arthritis are normal/negative. Lyme disease testing was negative.  CBC was normal.  Your cholesterol profile slightly low HDL. Otherwise normal.    Interpreting your Cholesterol Profile Results: The LDL is the \"bad\" cholesterol that can clog the arteries.  The HDL is protective or \"good cholesterol\"; exercise and weight management can boost this level. Triglycerides are the sugary-fats in the blood. The ratio of HDL to total cholesterol under 5.0 is optimal.  Getting regular exercise, eating more fruits and vegetables, less red meat, less fried foods, less \"sweets\", and keeping dairy in moderation are all things that can improve these numbers.               Resulted Orders   Testosterone, total   Result Value Ref Range    Testosterone Total 324 240 - 950 ng/dL      Comment:      This test was developed and its performance characteristics determined by the   [facility]  [facility]. It has   not been cleared or approved by the FDA. The laboratory is regulated under   CLIA as qualified to perform high-complexity testing. This test is used for   clinical purposes. It should not be regarded as investigational or for   research.     TSH with free T4 reflex   Result Value Ref Range    TSH 1.13 " 0.40 - 4.00 mU/L   Lipid panel reflex to direct LDL Fasting   Result Value Ref Range    Cholesterol 125 <200 mg/dL    Triglycerides 149 <150 mg/dL    HDL Cholesterol 35 (L) >39 mg/dL    LDL Cholesterol Calculated 60 <100 mg/dL      Comment:      Desirable:       <100 mg/dl    Non HDL Cholesterol 90 <130 mg/dL   Hemoglobin A1c   Result Value Ref Range    Hemoglobin A1C 5.2 0 - 5.6 %      Comment:      Normal <5.7% Prediabetes 5.7-6.4%  Diabetes 6.5% or higher - adopted from ADA   consensus guidelines.     ESR: Erythrocyte sedimentation rate   Result Value Ref Range    Sed Rate 5 0 - 15 mm/h   CRP, inflammation   Result Value Ref Range    CRP Inflammation <2.9 0.0 - 8.0 mg/L   Rheumatoid factor   Result Value Ref Range    Rheumatoid Factor <20 <20 IU/mL   Lyme Disease Oly with reflex to WB Serum   Result Value Ref Range    Lyme Disease Antibodies Serum 0.05 0.00 - 0.89      Comment:      Negative, Absence of detectable Borrelia burdorferi antibodies. A negative   result does not exclude the possibility of Borrelia burgdorferi infection. If   early Lyme disease is suspected, a second sample should be collected and   tested 2 to 4 weeks later.     Chlamydia trachomatis PCR   Result Value Ref Range    Specimen Description Urine     Chlamydia Trachomatis PCR Negative NEG^Negative      Comment:      Negative for C. trachomatis rRNA by transcription mediated amplification.  A negative result by transcription mediated amplification does not preclude   the presence of C. trachomatis infection because results are dependent on   proper and adequate collection, absence of inhibitors, and sufficient rRNA to   be detected.     Neisseria gonorrhoeae PCR   Result Value Ref Range    Specimen Descrip Urine     N Gonorrhea PCR Negative NEG^Negative      Comment:      Negative for N. gonorrhoeae rRNA by transcription mediated amplification.  A negative result by transcription mediated amplification does not preclude   the presence of N.  gonorrhoeae infection because results are dependent on   proper and adequate collection, absence of inhibitors, and sufficient rRNA to   be detected.     HIV Antigen Antibody Combo   Result Value Ref Range    HIV Antigen Antibody Combo Nonreactive NR^Nonreactive          Comment:      HIV-1 p24 Ag & HIV-1/HIV-2 Ab Not Detected   Anti Treponema   Result Value Ref Range    Treponema pallidum Antibody Negative NEG^Negative   Vitamin D Deficiency   Result Value Ref Range    Vitamin D Deficiency screening 11 (L) 20 - 75 ug/L      Comment:      Season, race, dietary intake, and treatment affect the concentration of   25-hydroxy-Vitamin D. Values may decrease during winter months and increase   during summer months. Values 20-29 ug/L may indicate Vitamin D insufficiency   and values <20 ug/L may indicate Vitamin D deficiency.  Vitamin D determination is routinely performed by an immunoassay specific for   25 hydroxyvitamin D3.  If an individual is on vitamin D2 (ergocalciferol)   supplementation, please specify 25 OH vitamin D2 and D3 level determination by   LCMSMS test VITD23.     CK total   Result Value Ref Range    CK Total 162 30 - 300 U/L   CBC with platelets   Result Value Ref Range    WBC 7.0 4.0 - 11.0 10e9/L    RBC Count 4.75 4.4 - 5.9 10e12/L    Hemoglobin 14.9 13.3 - 17.7 g/dL    Hematocrit 43.1 40.0 - 53.0 %    MCV 91 78 - 100 fl    MCH 31.4 26.5 - 33.0 pg    MCHC 34.6 31.5 - 36.5 g/dL    RDW 13.4 10.0 - 15.0 %    Platelet Count 218 150 - 450 10e9/L       If you have any questions or concerns, please call the clinic at the number listed above.       Sincerely,        Annie Fox PA-C

## 2018-04-30 DIAGNOSIS — E55.9 VITAMIN D DEFICIENCY: Primary | ICD-10-CM

## 2018-04-30 LAB
CHOLEST SERPL-MCNC: 125 MG/DL
CK SERPL-CCNC: 162 U/L (ref 30–300)
CRP SERPL-MCNC: <2.9 MG/L (ref 0–8)
DEPRECATED CALCIDIOL+CALCIFEROL SERPL-MC: 11 UG/L (ref 20–75)
HDLC SERPL-MCNC: 35 MG/DL
HIV 1+2 AB+HIV1 P24 AG SERPL QL IA: NONREACTIVE
LDLC SERPL CALC-MCNC: 60 MG/DL
NONHDLC SERPL-MCNC: 90 MG/DL
TRIGL SERPL-MCNC: 149 MG/DL
TSH SERPL DL<=0.005 MIU/L-ACNC: 1.13 MU/L (ref 0.4–4)

## 2018-05-01 LAB
B BURGDOR IGG+IGM SER QL: 0.05 (ref 0–0.89)
C TRACH DNA SPEC QL NAA+PROBE: NEGATIVE
N GONORRHOEA DNA SPEC QL NAA+PROBE: NEGATIVE
RHEUMATOID FACT SER NEPH-ACNC: <20 IU/ML (ref 0–20)
SPECIMEN SOURCE: NORMAL
SPECIMEN SOURCE: NORMAL
T PALLIDUM IGG+IGM SER QL: NEGATIVE

## 2018-05-02 LAB — TESTOST SERPL-MCNC: 324 NG/DL (ref 240–950)

## 2018-08-11 ENCOUNTER — APPOINTMENT (OUTPATIENT)
Dept: GENERAL RADIOLOGY | Facility: CLINIC | Age: 33
End: 2018-08-11
Attending: FAMILY MEDICINE
Payer: COMMERCIAL

## 2018-08-11 ENCOUNTER — HOSPITAL ENCOUNTER (EMERGENCY)
Facility: CLINIC | Age: 33
Discharge: HOME | End: 2018-08-11
Attending: FAMILY MEDICINE | Admitting: FAMILY MEDICINE
Payer: COMMERCIAL

## 2018-08-11 VITALS
TEMPERATURE: 98.2 F | HEART RATE: 68 BPM | SYSTOLIC BLOOD PRESSURE: 135 MMHG | DIASTOLIC BLOOD PRESSURE: 96 MMHG | RESPIRATION RATE: 18 BRPM | WEIGHT: 170 LBS | BODY MASS INDEX: 23.88 KG/M2 | OXYGEN SATURATION: 98 %

## 2018-08-11 DIAGNOSIS — S30.0XXA CONTUSION OF LOWER BACK, INITIAL ENCOUNTER: ICD-10-CM

## 2018-08-11 PROCEDURE — 72100 X-RAY EXAM L-S SPINE 2/3 VWS: CPT | Mod: TC

## 2018-08-11 PROCEDURE — 99282 EMERGENCY DEPT VISIT SF MDM: CPT | Mod: Z6 | Performed by: FAMILY MEDICINE

## 2018-08-11 PROCEDURE — 99283 EMERGENCY DEPT VISIT LOW MDM: CPT | Performed by: FAMILY MEDICINE
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
## 2018-08-11 NOTE — ED AVS SNAPSHOT
[facility] Emergency Department    [address] [place] [address]    Phone:  [phone]    Fax:  [phone]                                       Enmanuel Patterson   MRN: [id number]    Department:  [facility] Emergency Department   Date of Visit:  8/11/2018           After Visit Summary Signature Page     I have received my discharge instructions, and my questions have been answered. I have discussed any challenges I see with this plan with the nurse or doctor.    ..........................................................................................................................................  Patient/Patient Representative Signature      ..........................................................................................................................................  Patient Representative Print Name and Relationship to Patient    ..................................................               ................................................  Date                                            Time    ..........................................................................................................................................  Reviewed by Signature/Title    ...................................................              ..............................................  Date                                                            Time

## 2018-08-11 NOTE — ED AVS SNAPSHOT
[facility] Emergency Department    [address] [place] [address]    Phone:  [phone]    Fax:  [phone]                                       Enmanuel Patterson   MRN: [id number]    Department:  [facility] Emergency Department   Date of Visit:  8/11/2018           Patient Information     Date Of Birth          1985        Your diagnoses for this visit were:     Contusion of lower back, initial encounter        You were seen by Quirino Osuna MD.      Follow-up Information     Follow up with Zaki Jacome MD. Schedule an appointment as soon as possible for a visit in 5 days.    Specialty:  Family Practice    Why:  If not improving.    Contact information:    [address] [address] [address]  [phone]          Discharge Instructions         Back Contusion  You have a contusion to your back. A contusion is also called a bruise. There is swelling and some bleeding under the skin. The skin may be purplish. You may have muscle aching and stiffness in the area of the bruise. There are no broken bones.  Contusions heal on their own, without further treatment. However, pain and skin discoloration may take weeks to months to go away.   Home care    Rest. Avoid heavy lifting, strenuous exertion, or any activity that causes pain.    Ice the area to reduce pain and swelling. Put ice cubes in a plastic bag or use a cold pack. (Wrap the cold source in a thin towel. Don't place it directly on your skin.) Ice the injured area for 20 minutes every 1 to 2 hours the first day. Continue with ice packs 3 to 4 times a day for the next 2 days, then as needed for the relief of pain and swelling.    Take any prescribed pain medicine. If none was prescribed, take acetaminophen, ibuprofen, or naproxen to control pain, unless you have other medical conditions that prevent taking these medicines. If you are unsure about medicines, ask your healthcare provider before you leave the  hospital.  Follow-up care  Follow up with your healthcare provider, or as directed. Call if you are not better in 1 to 2 weeks.  When to seek medical advice  Call your healthcare provider for any of the following:    New or worsening pain    Increased swelling around the bruise    Pain spreads to one or both legs    Weakness or numbness in one or both legs     Loss of bowel or bladder control    Numbness in the groin or genital area    Fever of 100.4 F (38 C) or higher, or as directed by your healthcare provider  Date Last Reviewed: 7/1/2017 2000-2017 [employer]. [address] [place] [address]. All rights reserved. This information is not intended as a substitute for professional medical care. Always follow your healthcare professional's instructions.          24 Hour Appointment Hotline       To make an appointment at any [facility] [place], call [phone] ([phone]). If you don't have a family doctor or clinic, we will help you find one. [facility] clinics are conveniently located to serve the needs of you and your family.             Review of your medicines      Our records show that you are taking the medicines listed below. If these are incorrect, please call your family doctor or clinic.        Dose / Directions Last dose taken    ZYRTEC ALLERGY PO   Dose:  1 tablet        Take 1 tablet by mouth daily.   Refills:  0                Procedures and tests performed during your visit     XR Lumbar Spine 2/3 Views      Orders Needing Specimen Collection     None      Pending Results     Date and Time Order Name Status Description    8/11/2018 1006 XR Lumbar Spine 2/3 Views Preliminary             Pending Culture Results     No orders found from 8/9/2018 to 8/12/2018.            Pending Results Instructions     If you had any lab results that were not finalized at the time of your Discharge, you can call the ED Lab Result RN at [phone]. You will be contacted by this team for  "any positive Lab results or changes in treatment. The nurses are available 7 days a week from 10A to 6:30P.  You can leave a message 24 hours per day and they will return your call.        Thank you for choosing [facility]       Thank you for choosing [facility] for your care. Our goal is always to provide you with excellent care. Hearing back from our patients is one way we can continue to improve our services. Please take a few minutes to complete the written survey that you may receive in the mail after you visit with us. Thank you!        [facility] Information     [employer] lets you send messages to your doctor, view your test results, renew your prescriptions, schedule appointments and more. To sign up, go to [email or web address] . Click on \"Log in\" on the left side of the screen, which will take you to the Welcome page. Then click on \"Sign up Now\" on the right side of the page.     You will be asked to enter the access code listed below, as well as some personal information. Please follow the directions to create your username and password.     Your access code is: [email or web address]  Expires: 2018 10:41 AM     Your access code will  in 90 days. If you need help or a new code, please call your [facility] clinic or [phone].        Care EveryWhere ID     This is your Care EveryWhere ID. This could be used by other organizations to access your [facility] medical records  [id number]        Equal Access to Services     SAIMA WESTFALL : Hadii soy phillipso Soedis, waaxda luqadaha, qaybta kaalmada adeegyada, jesse mercado . So [facility] [phone].    ATENCIÓN: Si habla español, tiene a wray disposición servicios gratuitos de asistencia lingüística. Llame al [phone].    We comply with applicable federal civil rights laws and [place] laws. We do not discriminate on the basis of race, color, national origin, age, disability, sex, sexual orientation, or gender identity.            After " Visit Summary       This is your record. Keep this with you and show to your community pharmacist(s) and doctor(s) at your next visit.

## 2018-08-11 NOTE — ED PROVIDER NOTES
History     Chief Complaint   Patient presents with     Back Pain     HPI  Enmanuel Patterson is a 32 year old male who presents with concerns of back pain after hitting it on a trailer hitch.  This happened about 2 weeks ago.  Patient was standing underneath the truck and when he went to stand up he hit his back in a trailer hitch.  Since then he has been having increasing pain.  He states there was a little bleeding initially with the injury.  Patient denies any fevers or chills.  Patient states the pain does seem to move around somewhat.  Patient denies any pain that radiates into his legs.  Patient denies any bowel or bladder incontinence.  Patient has not really done much for the pain.    Problem List:    Patient Active Problem List    Diagnosis Date Noted     Prepatellar bursitis of right knee 03/08/2018     Priority: Medium     Tobacco use disorder 07/26/2013     Priority: Medium     Eczema 07/26/2013     Priority: Medium     CARDIOVASCULAR SCREENING; LDL GOAL LESS THAN 160 10/31/2010     Priority: Medium        Past Medical History:    Past Medical History:   Diagnosis Date     Testicular torsion 1995     Tobacco abuse since 1997       Past Surgical History:    Past Surgical History:   Procedure Laterality Date     bursitis knee  7/26/2013     TONSILLECTOMY      age 4       Family History:    Family History   Problem Relation Age of Onset     Diabetes Paternal Grandfather      Asthma Paternal Grandfather      No Known Problems Mother      No Known Problems Father        Social History:  Marital Status:  Single [1]  Social History   Substance Use Topics     Smoking status: Current Every Day Smoker     Packs/day: 1.00     Years: 9.00     Types: Cigarettes     Smokeless tobacco: Current User     Types: Chew      Comment: 1 pack per day since 1997     Alcohol use 0.0 oz/week      Comment: weekends        Medications:      Cetirizine HCl (ZYRTEC ALLERGY PO)         Review of Systems   All other systems reviewed  and are negative.      Physical Exam   BP: (!) 135/96  Pulse: 68  Temp: 98.2  F (36.8  C)  Resp: 18  Weight: 77.1 kg (170 lb)  SpO2: 98 %      Physical Exam   Constitutional: He appears well-developed and well-nourished. No distress.   HENT:   Head: Normocephalic and atraumatic.   Mouth/Throat: Oropharynx is clear and moist. No oropharyngeal exudate.   Musculoskeletal:        Lumbar back: He exhibits pain. He exhibits normal range of motion, no tenderness, no bony tenderness, no swelling, no edema, no deformity, no laceration, no spasm and normal pulse.        Back:    Skin: He is not diaphoretic.   Nursing note and vitals reviewed.      ED Course     ED Course     Procedures          Results for orders placed or performed during the hospital encounter of 08/11/18 (from the past 24 hour(s))   XR Lumbar Spine 2/3 Views    Narrative    LUMBAR SPINE TWO TO THREE VIEWS   8/11/2018 10:23 AM     HISTORY:  Trauma, back pain.     COMPARISON: 4/28/2009    FINDINGS: Right asymmetric disc space narrowing at L4-L5.      Impression    IMPRESSION: No fracture identified.       Medications - No data to display     X-rays are negative for fracture.  I think patient most likely has a back contusion.  Patient's symptoms have gone on for a while but there does not appear to be any other signs of significant injury.  Recommend continued conservative care including ice, ibuprofen, patient will follow-up if there is no improvement over the next few days.      Assessments & Plan (with Medical Decision Making)  Low back contusion     I have reviewed the nursing notes.    I have reviewed the findings, diagnosis, plan and need for follow up with the patient.          8/11/2018   [facility] EMERGENCY DEPARTMENT     Quirino Osuna MD  08/11/18 6585

## 2022-06-28 ENCOUNTER — APPOINTMENT (OUTPATIENT)
Dept: ULTRASOUND IMAGING | Facility: CLINIC | Age: 37
End: 2022-06-28
Attending: FAMILY MEDICINE
Payer: COMMERCIAL

## 2022-06-28 ENCOUNTER — HOSPITAL ENCOUNTER (EMERGENCY)
Facility: CLINIC | Age: 37
Discharge: HOME | End: 2022-06-28
Attending: FAMILY MEDICINE | Admitting: FAMILY MEDICINE
Payer: COMMERCIAL

## 2022-06-28 VITALS
RESPIRATION RATE: 15 BRPM | BODY MASS INDEX: 26.48 KG/M2 | WEIGHT: 185 LBS | HEIGHT: 70 IN | DIASTOLIC BLOOD PRESSURE: 99 MMHG | TEMPERATURE: 98.1 F | HEART RATE: 80 BPM | SYSTOLIC BLOOD PRESSURE: 145 MMHG | OXYGEN SATURATION: 100 %

## 2022-06-28 DIAGNOSIS — N45.1 EPIDIDYMITIS: ICD-10-CM

## 2022-06-28 LAB
ALBUMIN UR-MCNC: NEGATIVE MG/DL
APPEARANCE UR: CLEAR
BILIRUB UR QL STRIP: NEGATIVE
COLOR UR AUTO: NORMAL
GLUCOSE UR STRIP-MCNC: NEGATIVE MG/DL
HGB UR QL STRIP: NEGATIVE
KETONES UR STRIP-MCNC: NEGATIVE MG/DL
LEUKOCYTE ESTERASE UR QL STRIP: NEGATIVE
NITRATE UR QL: NEGATIVE
PH UR STRIP: 6 [PH] (ref 5–7)
RBC URINE: <1 /HPF
SP GR UR STRIP: 1.01 (ref 1–1.03)
UROBILINOGEN UR STRIP-MCNC: NORMAL MG/DL
WBC URINE: 0 /HPF

## 2022-06-28 PROCEDURE — 250N000013 HC RX MED GY IP 250 OP 250 PS 637: Performed by: FAMILY MEDICINE

## 2022-06-28 PROCEDURE — 93976 VASCULAR STUDY: CPT

## 2022-06-28 PROCEDURE — 99284 EMERGENCY DEPT VISIT MOD MDM: CPT | Performed by: FAMILY MEDICINE

## 2022-06-28 PROCEDURE — 99284 EMERGENCY DEPT VISIT MOD MDM: CPT | Mod: 25 | Performed by: FAMILY MEDICINE

## 2022-06-28 PROCEDURE — 81001 URINALYSIS AUTO W/SCOPE: CPT | Performed by: FAMILY MEDICINE

## 2022-06-28 RX ORDER — CIPROFLOXACIN 500 MG/1
500 TABLET, FILM COATED ORAL 2 TIMES DAILY
Qty: 14 TABLET | Refills: 0 | Status: SHIPPED | OUTPATIENT
Start: 2022-06-28

## 2022-06-28 RX ORDER — IBUPROFEN 800 MG/1
800 TABLET, FILM COATED ORAL ONCE
Status: COMPLETED | OUTPATIENT
Start: 2022-06-28 | End: 2022-06-28

## 2022-06-28 RX ORDER — IBUPROFEN 600 MG/1
600 TABLET, FILM COATED ORAL EVERY 6 HOURS PRN
Qty: 30 TABLET | Refills: 0 | Status: SHIPPED | OUTPATIENT
Start: 2022-06-28

## 2022-06-28 RX ADMIN — IBUPROFEN 800 MG: 800 TABLET, FILM COATED ORAL at 21:38

## 2022-06-29 NOTE — ED PROVIDER NOTES
History     Chief Complaint   Patient presents with     Testicular/scrotal Pain     HPI  Enmanuel Patterson is a 36 year old male who presents with right testicular pain that started about 9 hours ago.  Patient's had a history of a testicular torsion as a kid and did have surgery to repair this.  This kind of feels similar.  He thinks there is a little swelling of his scrotum.  Patient's had a vasectomy in the past.  Denies any trauma today.  Denies any dysuria or hematuria.  Touching the area makes the pain worse, nothing helps.    Allergies:  Allergies   Allergen Reactions     Sulfa Drugs        Problem List:    Patient Active Problem List    Diagnosis Date Noted     Prepatellar bursitis of right knee 03/08/2018     Priority: Medium     Tobacco use disorder 07/26/2013     Priority: Medium     Eczema 07/26/2013     Priority: Medium     CARDIOVASCULAR SCREENING; LDL GOAL LESS THAN 160 10/31/2010     Priority: Medium        Past Medical History:    Past Medical History:   Diagnosis Date     Testicular torsion 1995     Tobacco abuse since 1997       Past Surgical History:    Past Surgical History:   Procedure Laterality Date     bursitis knee  7/26/2013     TONSILLECTOMY      age 4       Family History:    Family History   Problem Relation Age of Onset     Diabetes Paternal Grandfather      Asthma Paternal Grandfather      No Known Problems Mother      No Known Problems Father        Social History:  Marital Status:  Single [1]  Social History     Tobacco Use     Smoking status: Current Every Day Smoker     Packs/day: 1.00     Years: 9.00     Pack years: 9.00     Types: Cigarettes     Smokeless tobacco: Current User     Types: Chew     Tobacco comment: 1 pack per day since 1997   Substance Use Topics     Alcohol use: Yes     Alcohol/week: 0.0 standard drinks     Comment: weekends     Drug use: No        Medications:    ciprofloxacin (CIPRO) 500 MG tablet  ibuprofen (ADVIL/MOTRIN) 600 MG tablet  Cetirizine HCl (ZYRTEC  "ALLERGY PO)          Review of Systems   All other systems reviewed and are negative.      Physical Exam   BP: (!) 145/99  Pulse: 80  Temp: 98.1  F (36.7  C)  Resp: 15  Height: 177.8 cm (5' 10\")  Weight: 83.9 kg (185 lb)  SpO2: 100 %      Physical Exam  Vitals and nursing note reviewed.   Constitutional:       General: He is not in acute distress.     Appearance: He is well-developed. He is not diaphoretic.   HENT:      Head: Normocephalic and atraumatic.   Eyes:      Conjunctiva/sclera: Conjunctivae normal.   Cardiovascular:      Rate and Rhythm: Normal rate and regular rhythm.      Heart sounds: Normal heart sounds. No murmur heard.  Pulmonary:      Effort: Pulmonary effort is normal. No respiratory distress.      Breath sounds: Normal breath sounds. No stridor. No wheezing.   Abdominal:      General: Bowel sounds are normal. There is no distension.      Palpations: Abdomen is soft.      Tenderness: There is no abdominal tenderness. There is no guarding.   Musculoskeletal:         General: Normal range of motion.      Cervical back: Normal range of motion.   Skin:     General: Skin is warm and dry.      Findings: No rash.   Neurological:      Mental Status: He is alert and oriented to person, place, and time.   Psychiatric:         Judgment: Judgment normal.         ED Course                 Procedures        Results for orders placed or performed during the hospital encounter of 06/28/22 (from the past 24 hour(s))   US Testicular & Scrotum w Doppler Ltd    Narrative    EXAM: US TESTICULAR AND SCROTUM WITH DOPPLER LIMITED  LOCATION: [facility]  DATE/TIME: 6/28/2022 9:41 PM    INDICATION: right testicular pain, hx of torsiion  COMPARISON: None  TECHNIQUE: Ultrasound of scrotum with color flow and spectral Doppler with waveform analysis performed.    FINDINGS:    RIGHT: Right testicle measures 4.8 x 3.3 x 2.5 cm. Normal testicle with no masses. Normal arterial duplex and normal color " flow. Mildly enlarged and hyperemic epididymis. No hydrocele. No varicocele.    LEFT: Left testicle measures 5.1 x 3.1 x 2.7 cm. Normal testicle with no masses. Normal arterial duplex and normal color flow. Normal epididymis. No hydrocele. No varicocele.      Impression    IMPRESSION:  1.  Testicles normal, no mass or torsion.  2.  Asymmetric fullness and hyperemia involving right epididymis consistent with epididymitis.   UA with Microscopic reflex to Culture    Specimen: Urine, Midstream   Result Value Ref Range    Color Urine Straw Colorless, Straw, Light Yellow, Yellow    Appearance Urine Clear Clear    Glucose Urine Negative Negative mg/dL    Bilirubin Urine Negative Negative    Ketones Urine Negative Negative mg/dL    Specific Gravity Urine 1.011 1.003 - 1.035    Blood Urine Negative Negative    pH Urine 6.0 5.0 - 7.0    Protein Albumin Urine Negative Negative mg/dL    Urobilinogen Urine Normal Normal, 2.0 mg/dL    Nitrite Urine Negative Negative    Leukocyte Esterase Urine Negative Negative    RBC Urine <1 <=2 /HPF    WBC Urine 0 <=5 /HPF    Narrative    Urine Culture not indicated       Medications   ibuprofen (ADVIL/MOTRIN) tablet 800 mg (800 mg Oral Given 6/28/22 2138)     Ultrasound was negative for torsion, did show findings consistent with epididymitis on the affected side.  Patient states he has been told by urology before that he has urine reflux and this is likely the cause of recurrent infections as he has been on recurrent antibiotics.  We will go ahead and put him on another course of Cipro and ibuprofen.  Patient will follow-up with either his urologist where he was given a number to a new urologist to follow-up with.    Assessments & Plan (with Medical Decision Making)  epididmytis     I have reviewed the nursing notes.    I have reviewed the findings, diagnosis, plan and need for follow up with the patient.      New Prescriptions    CIPROFLOXACIN (CIPRO) 500 MG TABLET    Take 1 tablet (500 mg)  by mouth 2 times daily    IBUPROFEN (ADVIL/MOTRIN) 600 MG TABLET    Take 1 tablet (600 mg) by mouth every 6 hours as needed for moderate pain       Final diagnoses:   Epididymitis       6/28/2022   [facility] EMERGENCY DEPT     Quirino Osuna MD  06/28/22 6416

## 2022-06-29 NOTE — ED TRIAGE NOTES
Presents with right testicle pain and concerned for torsion but did have same issue when he was a child.  Pain started at 1300, hard to tell if swollen describes as feeling funny.

## 2023-07-13 NOTE — PATIENT INSTRUCTIONS
Labs today    Results early next week    Apply small amount- thin layer to area (can use q-tip) every other day (2-3 x per week) for up to 16 weeks. Apply at bedtime, wash off in morning.   Can cause redness or burning at site    Quit smoking         n/a

## 2024-06-18 ENCOUNTER — OFFICE VISIT (OUTPATIENT)
Dept: UROLOGY | Facility: CLINIC | Age: 39
End: 2024-06-18
Payer: COMMERCIAL

## 2024-06-18 DIAGNOSIS — N45.1 RIGHT EPIDIDYMITIS: Primary | ICD-10-CM

## 2024-06-18 PROCEDURE — 99203 OFFICE O/P NEW LOW 30 MIN: CPT | Performed by: UROLOGY

## 2024-06-18 NOTE — NURSING NOTE
Enmanuel Patterson's goals for this visit include:   Chief Complaint   Patient presents with    Consult     Testicular Swelling; per pt, self refd, recs in epic, location verified, imaging in PACS       He requests these members of his care team be copied on today's visit information:     PCP: No Ref-Primary, Physician    Referring Provider:  Referred Self, MD  No address on file    There were no vitals taken for this visit.    Do you need any medication refills at today's visit?     Lisa Daily MA on 6/18/2024 at 2:46 PM

## 2024-06-18 NOTE — PROGRESS NOTES
Urology Consult History and Physical  BERNAD[facility] GROVE   Name: Enmanuel Patterson    MRN: [id number]   YOB: 1985       We were asked to see Enmanuel Patterson at the request of SELF for evaluation and treatment of right epididymitis.        Chief Complaint:   Right epididymitis     History is obtained from the patient            History of Present Illness:   Enmanuel Patterson is a 38 year old male who is being seen for evaluation of right epididymitis     History of vasectomy about 10 years ago  He has had issues with recurrent epididymitis over the past 3 years  He saw prior Urology through [facility] with recommendation for conservative management, epididymectomy, or orchiectomy     Pain ranges from dull to more sharp  Constant swollen size  Has had 4-5 rounds of antibiotics    Remote history of testicular torsion at age 5   Had successful detorsion surgery and orchiopexy            Past Medical History:     Past Medical History:   Diagnosis Date    Testicular torsion 1995    Tobacco abuse since 1997            Past Surgical History:     Past Surgical History:   Procedure Laterality Date    bursitis knee  7/26/2013    TONSILLECTOMY      age 4            Social History:     Social History     Tobacco Use    Smoking status: Every Day     Current packs/day: 1.00     Average packs/day: 1 pack/day for 9.0 years (9.0 ttl pk-yrs)     Types: Cigarettes    Smokeless tobacco: Current     Types: Chew    Tobacco comments:     1 pack per day since 1997   Substance Use Topics    Alcohol use: Yes     Alcohol/week: 0.0 standard drinks of alcohol     Comment: weekends       History   Smoking Status    Every Day    Packs/day: 1.00    Years: 9.00    Types: Cigarettes   Smokeless Tobacco    Current    Types: Chew     Comment: 1 pack per day since 1997            Family History:     Family History   Problem Relation Age of Onset    Diabetes Paternal Grandfather     Asthma Paternal Grandfather     No Known Problems Mother     No  Known Problems Father               Allergies:     Allergies   Allergen Reactions    Sulfa Antibiotics             Medications:     Current Outpatient Medications   Medication Sig Dispense Refill    Cetirizine HCl (ZYRTEC ALLERGY PO) Take 1 tablet by mouth daily.      ibuprofen (ADVIL/MOTRIN) 600 MG tablet Take 1 tablet (600 mg) by mouth every 6 hours as needed for moderate pain 30 tablet 0    ciprofloxacin (CIPRO) 500 MG tablet Take 1 tablet (500 mg) by mouth 2 times daily (Patient not taking: Reported on 6/18/2024) 14 tablet 0     No current facility-administered medications for this visit.             Review of Systems:     Reviewed and otherwise negative except as noted above           Physical Exam:   No data found.  There is no height or weight on file to calculate BMI.     General: age-appropriate appearing male in NAD  HEENT: Head AT/NC, EOMI, CN Grossly intact  Lungs: no respiratory distress, or pursed lip breathing  Heart: No obvious jugular venous distension present  Abdomen: soft, non-distended, non-tender.   : normal phallus, normal left testis and epididymis, normal right testis, slightly enlarged and prominent right epididymis especially the epididymal head   Lymph: no palpable inguinal lymphadenopathy.  LE: no edema.   Musculoskeltal: extremities normal, no peripheral edema  Skin: no suspicious lesions or rashes  Neuro: Alert, oriented, speech and mentation normal;  moving all 4 extremities equally.  Psych: affect and mood normal          Data:   All laboratory data reviewed:    UA RESULTS:  Recent Labs   Lab Test 06/28/22  2300   COLOR Straw   APPEARANCE Clear   URINEGLC Negative   URINEBILI Negative   URINEKETONE Negative   SG 1.011   UBLD Negative   URINEPH 6.0   PROTEIN Negative   NITRITE Negative   LEUKEST Negative   RBCU <1   WBCU 0      Lab Results   Component Value Date    CR 0.88 10/18/2008      IMAGING:  All pertinent imaging reviewed:    All imaging studies reviewed by me.  I personally  reviewed these imaging films.  A formal report from radiology will follow.    U/S TESTICULAR 6/28/2022:  FINDINGS:     RIGHT: Right testicle measures 4.8 x 3.3 x 2.5 cm. Normal testicle with no masses. Normal arterial duplex and normal color flow. Mildly enlarged and hyperemic epididymis. No hydrocele. No varicocele.     LEFT: Left testicle measures 5.1 x 3.1 x 2.7 cm. Normal testicle with no masses. Normal arterial duplex and normal color flow. Normal epididymis. No hydrocele. No varicocele.                                                                      IMPRESSION:  1.  Testicles normal, no mass or torsion.  2.  Asymmetric fullness and hyperemia involving right epididymis consistent with epididymitis.      U/S TESTICULAR 4/18/2023:  IMPRESSION:   1. Negative bilateral testicles without evidence of intratesticular mass or torsion.   2. Heterogeneous echogenicity of the bilateral epididymides with hypervascularity on the right and small amount of surrounding anechoic free fluid suggestive of acute epididymitis. No discrete solid mass.          Impression and Plan:   Impression:   38 year old man with history of recurrent right epididymitis       Plan:   Right epididymitis   - I reviewed his prior Urology notes from [facility]  - I reviewed his prior U/S both from 2022 and 2023  - On exam today there is slight prominence of his right epididymis   - We discussed that it is most likely he is having recurrent non-bacterial epididymitis   - I would recommend use of a course of NSAIDs   - We discussed that he could also consider a referral to Dr. Marques for cord block and consideration for cord denervation vs epididymectomy  - He will contact our office if he desires a referral to Dr. Marques     Thank you for the kind consultation.    Time spent: 20 minutes spent on the date of the encounter doing chart review, history and exam, documentation and further activities as noted above.    Clemente Figueroa MD    Urology  [facility]  [facility] Phone: [phone]  [facility] Phone: [phone]

## 2025-04-24 NOTE — DISCHARGE INSTRUCTIONS
Back Contusion  You have a contusion to your back. A contusion is also called a bruise. There is swelling and some bleeding under the skin. The skin may be purplish. You may have muscle aching and stiffness in the area of the bruise. There are no broken bones.  Contusions heal on their own, without further treatment. However, pain and skin discoloration may take weeks to months to go away.   Home care    Rest. Avoid heavy lifting, strenuous exertion, or any activity that causes pain.    Ice the area to reduce pain and swelling. Put ice cubes in a plastic bag or use a cold pack. (Wrap the cold source in a thin towel. Don't place it directly on your skin.) Ice the injured area for 20 minutes every 1 to 2 hours the first day. Continue with ice packs 3 to 4 times a day for the next 2 days, then as needed for the relief of pain and swelling.    Take any prescribed pain medicine. If none was prescribed, take acetaminophen, ibuprofen, or naproxen to control pain, unless you have other medical conditions that prevent taking these medicines. If you are unsure about medicines, ask your healthcare provider before you leave the hospital.  Follow-up care  Follow up with your healthcare provider, or as directed. Call if you are not better in 1 to 2 weeks.  When to seek medical advice  Call your healthcare provider for any of the following:    New or worsening pain    Increased swelling around the bruise    Pain spreads to one or both legs    Weakness or numbness in one or both legs     Loss of bowel or bladder control    Numbness in the groin or genital area    Fever of 100.4 F (38 C) or higher, or as directed by your healthcare provider  Date Last Reviewed: 7/1/2017 2000-2017 [employer] [address] [place] [address], [place] [address]. All rights reserved. This information is not intended as a substitute for professional medical care. Always follow your healthcare professional's instructions.         No Resident Program within this Specialty at this Location